# Patient Record
Sex: MALE | Race: WHITE | NOT HISPANIC OR LATINO | Employment: UNEMPLOYED | URBAN - METROPOLITAN AREA
[De-identification: names, ages, dates, MRNs, and addresses within clinical notes are randomized per-mention and may not be internally consistent; named-entity substitution may affect disease eponyms.]

---

## 2020-09-10 NOTE — PROGRESS NOTES
"Subjective     Cheo Galvan is a 34 year old male who presents to clinic today for the following health issues:    HPI       Concern - swollen lymph nodes  Onset: intermittent for 2 months  Description: spitting occasionally, swollen lymph nodes, red spots on tongue  Intensity: mild  Progression of Symptoms:  intermittent  Accompanying Signs & Symptoms: spitting, red spots  Previous history of similar problem: none  Precipitating factors:        Worsened by: smoking  Alleviating factors:        Improved by: none  Therapies tried and outcome: Patient has quit smoking and has not seen any improvement.       Review of Systems   Constitutional, HEENT, cardiovascular, pulmonary, GI, , musculoskeletal, neuro, skin, endocrine and psych systems are negative, except as otherwise noted.      Objective    /72   Pulse 60   Temp 97.4  F (36.3  C) (Temporal)   Resp 16   Ht 1.778 m (5' 10\")   Wt 84.8 kg (187 lb)   SpO2 98%   BMI 26.83 kg/m    Body mass index is 26.83 kg/m .  Physical Exam   GENERAL: healthy, alert and no distress  NECK: no adenopathy, no asymmetry, masses, or scars and thyroid normal to palpation  RESP: lungs clear to auscultation - no rales, rhonchi or wheezes  CV: regular rate and rhythm, normal S1 S2, no S3 or S4, no murmur, click or rub, no peripheral edema and peripheral pulses strong  ABDOMEN: soft, nontender, no hepatosplenomegaly, no masses and bowel sounds normal  MS: no gross musculoskeletal defects noted, no edema    No results found for this or any previous visit (from the past 24 hour(s)).        Assessment & Plan     1. Lymphadenopathy  2. Tobacco abuse  Waxing and waning nature of his lymphadenopathy is reassuring in my opinion at this point in time.  Advised that if this were to, and state that we should do a CT scan soft tissue of the head and neck to evaluate this further.  May return in 3 to 4 weeks if this is not resolving.  He is congratulated on his recent tobacco use " "cessation.       BMI:   Estimated body mass index is 26.83 kg/m  as calculated from the following:    Height as of this encounter: 1.778 m (5' 10\").    Weight as of this encounter: 84.8 kg (187 lb).   Weight management plan noted, stable and monitoring        Work on weight loss  Regular exercise  Return in about 3 weeks (around 10/5/2020) for if symptoms do not improve, recheck of current condition.    Feliberto Miranda PA-C  [facility]    "

## 2020-09-14 ENCOUNTER — OFFICE VISIT (OUTPATIENT)
Dept: FAMILY MEDICINE | Facility: OTHER | Age: 35
End: 2020-09-14
Payer: COMMERCIAL

## 2020-09-14 ENCOUNTER — TELEPHONE (OUTPATIENT)
Dept: FAMILY MEDICINE | Facility: OTHER | Age: 35
End: 2020-09-14

## 2020-09-14 VITALS
HEIGHT: 70 IN | TEMPERATURE: 97.4 F | OXYGEN SATURATION: 98 % | RESPIRATION RATE: 16 BRPM | WEIGHT: 187 LBS | HEART RATE: 60 BPM | BODY MASS INDEX: 26.77 KG/M2 | DIASTOLIC BLOOD PRESSURE: 72 MMHG | SYSTOLIC BLOOD PRESSURE: 102 MMHG

## 2020-09-14 DIAGNOSIS — Z72.0 TOBACCO ABUSE: ICD-10-CM

## 2020-09-14 DIAGNOSIS — R59.1 LYMPHADENOPATHY: Primary | ICD-10-CM

## 2020-09-14 PROCEDURE — 99203 OFFICE O/P NEW LOW 30 MIN: CPT | Performed by: PHYSICIAN ASSISTANT

## 2020-09-14 ASSESSMENT — MIFFLIN-ST. JEOR: SCORE: 1794.48

## 2020-09-14 NOTE — TELEPHONE ENCOUNTER
Started two months ago     Off and on swollen lymph nodes and cough.   Cough is dry.   Lymph nodes in jaw/neck are swollen.   No difficulty breathing.   No chest pain.   Also has runny nose.   States he get a sinus infection about once a month  Gets dry enough to cause some bloody nose.     No exposure to COVID.   Laid off right now.   No fever.  No Chills.     Patient being roomed now.    Kenyatta Gutierrez, RN BSN

## 2021-09-22 ENCOUNTER — MYC MEDICAL ADVICE (OUTPATIENT)
Dept: FAMILY MEDICINE | Facility: OTHER | Age: 36
End: 2021-09-22

## 2021-09-22 ENCOUNTER — VIRTUAL VISIT (OUTPATIENT)
Dept: FAMILY MEDICINE | Facility: OTHER | Age: 36
End: 2021-09-22
Payer: COMMERCIAL

## 2021-09-22 ENCOUNTER — TELEPHONE (OUTPATIENT)
Dept: FAMILY MEDICINE | Facility: OTHER | Age: 36
End: 2021-09-22

## 2021-09-22 DIAGNOSIS — L23.7 CONTACT DERMATITIS DUE TO POISON IVY: Primary | ICD-10-CM

## 2021-09-22 PROCEDURE — 99213 OFFICE O/P EST LOW 20 MIN: CPT | Mod: TEL | Performed by: NURSE PRACTITIONER

## 2021-09-22 RX ORDER — HYDROXYZINE HYDROCHLORIDE 25 MG/1
25-50 TABLET, FILM COATED ORAL 3 TIMES DAILY PRN
Qty: 30 TABLET | Refills: 0 | Status: SHIPPED | OUTPATIENT
Start: 2021-09-22 | End: 2022-09-12

## 2021-09-22 RX ORDER — TRIAMCINOLONE ACETONIDE 1 MG/G
CREAM TOPICAL 2 TIMES DAILY
Qty: 80 G | Refills: 0 | Status: SHIPPED | OUTPATIENT
Start: 2021-09-22 | End: 2022-09-12

## 2021-09-22 RX ORDER — METHYLPREDNISOLONE 4 MG
TABLET, DOSE PACK ORAL
Qty: 21 TABLET | Refills: 0 | Status: SHIPPED | OUTPATIENT
Start: 2021-09-22 | End: 2022-09-12

## 2021-09-22 NOTE — PROGRESS NOTES
Cheo is a 35 year old who is being evaluated via a billable video visit.      How would you like to obtain your AVS? MyChart  If the video visit is dropped, the invitation should be resent by: Send to e-mail at: [email or web address]  Will anyone else be joining your video visit? No        Assessment & Plan     Contact dermatitis due to poison ivy  - Suspect that patient has active dermatitis due to possible poison ivy  - Based on HPI and pictures I recommend that we do a medrol dose librado along with topical steroid cream.   - He will continue with avoiding itching and I have provided him with Atarax to help with this. Advised that this can cause drowsiness and not to take if driving or operating machinery.   - Follow up if no improvement.   - methylPREDNISolone (MEDROL DOSEPAK) 4 MG tablet therapy pack; Follow Package Directions  - triamcinolone (KENALOG) 0.1 % external cream; Apply topically 2 times daily For 14 days  - hydrOXYzine (ATARAX) 25 MG tablet; Take 1-2 tablets (25-50 mg) by mouth 3 times daily as needed for itching Do not take if driving or operating machinery      The patient indicates understanding of these issues and agrees with the plan.    There are no Patient Instructions on file for this visit.    No follow-ups on file.    ROBERTO CARLOS Zhang [facility]   Cheo is a 35 year old who presents for the following health issues     HPI       Rash  Onset/Duration: Started 2-2 1/2 weeks ago   Description  Location: From his ankles to above his knee. Both legs   Character: blotchy, itchy   Itching: moderate  Intensity:  moderate  Progression of Symptoms:  same  Accompanying signs and symptoms:   Fever: no  Body aches or joint pain: no  Sore throat symptoms: no  Recent cold symptoms: no  History:           Previous episodes of similar rash: None  New exposures: Been in the woods a lot of times prior to this so thinks this is where he got it.   Recent travel:  no  Exposure to similar rash: no  Precipitating or alleviating factors:   Therapies tried and outcome:   Poison IVy cream  Calamine lotion  Oatmeal.     No pus, no fevers or chills.     Review of Systems         Objective           Vitals:  No vitals were obtained today due to virtual visit.    Physical Exam   GENERAL: Healthy, alert and no distress  PSYCH: Mentation appears normal, affect normal/bright, judgement and insight intact, normal speech and appearance well-groomed.    Diagnostic: None     Telephone 9 minutes

## 2021-10-31 ENCOUNTER — HEALTH MAINTENANCE LETTER (OUTPATIENT)
Age: 36
End: 2021-10-31

## 2022-06-22 ENCOUNTER — VIRTUAL VISIT (OUTPATIENT)
Dept: FAMILY MEDICINE | Facility: OTHER | Age: 37
End: 2022-06-22
Payer: COMMERCIAL

## 2022-06-22 DIAGNOSIS — L23.7 CONTACT DERMATITIS DUE TO POISON IVY: Primary | ICD-10-CM

## 2022-06-22 PROCEDURE — 99213 OFFICE O/P EST LOW 20 MIN: CPT | Mod: 95 | Performed by: NURSE PRACTITIONER

## 2022-06-22 RX ORDER — METHYLPREDNISOLONE 4 MG
TABLET, DOSE PACK ORAL
Qty: 21 TABLET | Refills: 0 | Status: SHIPPED | OUTPATIENT
Start: 2022-06-22 | End: 2022-09-12

## 2022-06-22 RX ORDER — TRIAMCINOLONE ACETONIDE 1 MG/G
CREAM TOPICAL 2 TIMES DAILY
Qty: 60 G | Refills: 0 | Status: SHIPPED | OUTPATIENT
Start: 2022-06-22 | End: 2022-09-12

## 2022-06-22 NOTE — PROGRESS NOTES
Cheo is a 36 year old who is being evaluated via a billable video visit.      How would you like to obtain your AVS? MyChart  If the video visit is dropped, the invitation should be resent by: Text to cell phone: Tyron  Will anyone else be joining your video visit? No        Assessment & Plan     Contact dermatitis due to poison ivy  Rash consistent with possible poison Ivy, notes it is present along his arms and legs. Recommend trial of oral and topical steroids. Discussed at home therapy including avoiding itching, calamine lotion, cold compress to area and antihistamines for itching/swelling. Has taken oral steroids before without concerns or troubles. Denies any history of DM or use of recent steroids.   - Follow up if no improvement or worsening.   - methylPREDNISolone (MEDROL DOSEPAK) 4 MG tablet therapy pack; Follow Package Directions  - triamcinolone (KENALOG) 0.1 % external cream; Apply topically 2 times daily         There are no Patient Instructions on file for this visit.    No follow-ups on file.    ROBERTO CARLOS Zhang [facility]   Cheo is a 36 year old{, presenting for the following health issues:  No chief complaint on file.      History of Present Illness       Reason for visit:  Rash  Symptom onset:  1-3 days ago  Symptoms include:  Itchy rash  Symptom intensity:  Severe  Symptom progression:  Staying the same  Had these symptoms before:  No    He eats 0-1 servings of fruits and vegetables daily.He consumes 5 sweetened beverage(s) daily.He exercises with enough effort to increase his heart rate 30 to 60 minutes per day.  He exercises with enough effort to increase his heart rate 5 days per week.   He is taking medications regularly.     Was in tall grass, thinks it could be poison Ivy.   Clear fluid coming.     Review of Systems         Objective           Vitals:  No vitals were obtained today due to virtual visit.    Physical Exam   GENERAL: Healthy,  alert and no distress  RESP: No audible wheeze, cough, or visible cyanosis.  No visible retractions or increased work of breathing.    SKIN: Scattered papules along upper and lower right.left forearms consistent with dermatitis.   NEURO: Cranial nerves grossly intact.  Mentation and speech appropriate for age.  PSYCH: Mentation appears normal, affect normal/bright, judgement and insight intact, normal speech and appearance well-groomed.    Diagnostic: None     Video-Visit Details    Video Start Time: 9:27 AM    Type of service:  Video Visit    Video End Time:9:33 AM    Originating Location (pt. Location): Home    Distant Location (provider location):  [facility]     Platform used for Video Visit: [employer]    .  ..

## 2022-09-12 ENCOUNTER — E-VISIT (OUTPATIENT)
Dept: FAMILY MEDICINE | Facility: OTHER | Age: 37
End: 2022-09-12
Payer: COMMERCIAL

## 2022-09-12 DIAGNOSIS — M54.9 BACK PAIN, UNSPECIFIED BACK LOCATION, UNSPECIFIED BACK PAIN LATERALITY, UNSPECIFIED CHRONICITY: Primary | ICD-10-CM

## 2022-09-12 PROCEDURE — 99207 PR NON-BILLABLE SERV PER CHARTING: CPT | Performed by: NURSE PRACTITIONER

## 2022-09-12 NOTE — PATIENT INSTRUCTIONS
Thank you for choosing us for your care. I think an in-clinic visit would be best next steps based on your symptoms. Please schedule a clinic appointment; you won t be charged for this eVisit.      You can schedule an appointment right here in [facility], or call [phone]

## 2022-09-12 NOTE — TELEPHONE ENCOUNTER
Provider E-Visit time total (minutes): No charge.       ROBERTO CARLOS Zhang CNP  Questions or concerns please feel free to send me a [employer] message or call me  Phone : [phone]

## 2022-10-23 ENCOUNTER — HEALTH MAINTENANCE LETTER (OUTPATIENT)
Age: 37
End: 2022-10-23

## 2022-12-10 ENCOUNTER — HEALTH MAINTENANCE LETTER (OUTPATIENT)
Age: 37
End: 2022-12-10

## 2024-01-14 ENCOUNTER — HEALTH MAINTENANCE LETTER (OUTPATIENT)
Age: 39
End: 2024-01-14

## 2025-01-26 ENCOUNTER — HEALTH MAINTENANCE LETTER (OUTPATIENT)
Age: 40
End: 2025-01-26